# Patient Record
Sex: FEMALE | Race: BLACK OR AFRICAN AMERICAN | URBAN - METROPOLITAN AREA
[De-identification: names, ages, dates, MRNs, and addresses within clinical notes are randomized per-mention and may not be internally consistent; named-entity substitution may affect disease eponyms.]

---

## 2018-07-25 PROBLEM — 254637007 NON-SMALL CELL LUNG CANCER: Status: ACTIVE | Noted: 2018-07-25

## 2018-07-25 PROBLEM — 38341003 HYPERTENSION: Status: ACTIVE | Noted: 2018-07-25

## 2018-08-01 PROBLEM — 3723001 ARTHRITIS: Status: ACTIVE | Noted: 2018-08-01

## 2018-08-01 PROBLEM — 134407002 CHRONIC BACK PAIN: Status: ACTIVE | Noted: 2018-08-01

## 2018-08-01 PROBLEM — 235595009 GASTROESOPHAGEAL REFLUX DISEASE: Status: ACTIVE | Noted: 2018-08-01

## 2021-08-28 ENCOUNTER — TELEPHONE ENCOUNTER (OUTPATIENT)
Dept: URBAN - METROPOLITAN AREA CLINIC 13 | Facility: CLINIC | Age: 61
End: 2021-08-28

## 2021-08-28 RX ORDER — POTASSIUM CHLORIDE 1500 MG/1
TABLET, FILM COATED, EXTENDED RELEASE ORAL
OUTPATIENT
End: 2018-08-01

## 2021-08-29 ENCOUNTER — TELEPHONE ENCOUNTER (OUTPATIENT)
Dept: URBAN - METROPOLITAN AREA CLINIC 13 | Facility: CLINIC | Age: 61
End: 2021-08-29

## 2021-08-29 RX ORDER — MELOXICAM 15 MG/1
TABLET ORAL
Status: ACTIVE | COMMUNITY

## 2021-08-29 RX ORDER — LACTULOSE 10 G/15ML
SOLUTION ORAL
Status: ACTIVE | COMMUNITY

## 2021-08-29 RX ORDER — FERROUS SULFATE 324(65)MG
TABLET, DELAYED RELEASE (ENTERIC COATED) ORAL
Status: ACTIVE | COMMUNITY

## 2021-08-29 RX ORDER — HYDROCHLOROTHIAZIDE 12.5 MG/1
CAPSULE ORAL
Status: ACTIVE | COMMUNITY

## 2021-08-29 RX ORDER — LUBIPROSTONE 24 UG/1
CAPSULE, GELATIN COATED ORAL
Status: ACTIVE | COMMUNITY
Start: 2018-07-25

## 2021-08-29 RX ORDER — HYDROCODONE BITARTRATE AND ACETAMINOPHEN 7.5; 325 MG/1; MG/1
TABLET ORAL
Status: ACTIVE | COMMUNITY

## 2021-08-29 RX ORDER — GABAPENTIN 300 MG/1
CAPSULE ORAL
Status: ACTIVE | COMMUNITY

## 2021-08-29 RX ORDER — TIZANIDINE 2 MG/1
TABLET ORAL
Status: ACTIVE | COMMUNITY

## 2021-08-29 RX ORDER — DULOXETINE 60 MG/1
CAPSULE, DELAYED RELEASE PELLETS ORAL
Status: ACTIVE | COMMUNITY

## 2021-08-29 RX ORDER — OMEPRAZOLE 40 MG/1
CAPSULE, DELAYED RELEASE ORAL
Status: ACTIVE | COMMUNITY

## 2021-08-29 RX ORDER — METHOCARBAMOL 500 MG/1
TABLET, FILM COATED ORAL
Status: ACTIVE | COMMUNITY

## 2021-08-29 RX ORDER — PROCHLORPERAZINE MALEATE 10 MG/1
TABLET ORAL
Status: ACTIVE | COMMUNITY

## 2022-01-24 ENCOUNTER — OFFICE VISIT (OUTPATIENT)
Dept: URBAN - METROPOLITAN AREA CLINIC 48 | Facility: CLINIC | Age: 62
End: 2022-01-24

## 2022-01-24 RX ORDER — MELOXICAM 15 MG/1
TABLET ORAL
Status: ACTIVE | COMMUNITY

## 2022-01-24 RX ORDER — GABAPENTIN 300 MG/1
CAPSULE ORAL
Status: ACTIVE | COMMUNITY

## 2022-01-24 RX ORDER — FLUTICASONE PROPIONATE AND SALMETEROL 50; 250 UG/1; UG/1
1 PUFF POWDER RESPIRATORY (INHALATION) TWICE A DAY
Status: ACTIVE | COMMUNITY

## 2022-01-24 RX ORDER — OMEPRAZOLE 40 MG/1
CAPSULE, DELAYED RELEASE ORAL
Status: ACTIVE | COMMUNITY

## 2022-01-24 RX ORDER — HYDROCORTISONE 25 MG/G
1 APPLICATION CREAM TOPICAL ONCE A DAY
Status: ACTIVE | COMMUNITY

## 2022-01-24 RX ORDER — FERROUS SULFATE 324(65)MG
TABLET, DELAYED RELEASE (ENTERIC COATED) ORAL
Status: ACTIVE | COMMUNITY

## 2022-01-24 RX ORDER — HYDROCODONE BITARTRATE AND ACETAMINOPHEN 7.5; 325 MG/1; MG/1
TABLET ORAL
Status: ACTIVE | COMMUNITY

## 2022-01-24 RX ORDER — OXYBUTYNIN CHLORIDE 10 MG/1
1 TABLET TABLET, EXTENDED RELEASE ORAL ONCE A DAY
Status: ACTIVE | COMMUNITY

## 2022-01-24 RX ORDER — DULOXETINE 60 MG/1
CAPSULE, DELAYED RELEASE PELLETS ORAL
Status: ACTIVE | COMMUNITY

## 2022-01-24 RX ORDER — CYCLOBENZAPRINE HYDROCHLORIDE 10 MG/1
1 TABLET AT BEDTIME AS NEEDED TABLET, FILM COATED ORAL ONCE A DAY
Status: ACTIVE | COMMUNITY

## 2022-01-24 RX ORDER — ALBUTEROL SULFATE 90 UG/1
1 PUFF AS NEEDED AEROSOL, METERED RESPIRATORY (INHALATION)
Status: ACTIVE | COMMUNITY

## 2022-01-24 RX ORDER — HYDROCHLOROTHIAZIDE 12.5 MG/1
CAPSULE ORAL
Status: ACTIVE | COMMUNITY

## 2022-01-24 RX ORDER — PRAVASTATIN SODIUM 40 MG/1
1 TABLET TABLET ORAL ONCE A DAY
Status: ACTIVE | COMMUNITY

## 2022-02-08 ENCOUNTER — OFFICE VISIT (OUTPATIENT)
Dept: URBAN - METROPOLITAN AREA CLINIC 48 | Facility: CLINIC | Age: 62
End: 2022-02-08
Payer: MEDICARE

## 2022-02-08 VITALS
OXYGEN SATURATION: 96 % | SYSTOLIC BLOOD PRESSURE: 139 MMHG | TEMPERATURE: 97.9 F | WEIGHT: 241 LBS | BODY MASS INDEX: 40.15 KG/M2 | HEART RATE: 98 BPM | DIASTOLIC BLOOD PRESSURE: 83 MMHG | HEIGHT: 65 IN

## 2022-02-08 DIAGNOSIS — K59.09: ICD-10-CM

## 2022-02-08 DIAGNOSIS — Z86.010 PERSONAL HISTORY OF COLONIC POLYPS: ICD-10-CM

## 2022-02-08 DIAGNOSIS — K64.9 HEMORRHOIDS, UNSPECIFIED HEMORRHOID TYPE: ICD-10-CM

## 2022-02-08 PROCEDURE — 99214 OFFICE O/P EST MOD 30 MIN: CPT | Performed by: PHYSICIAN ASSISTANT

## 2022-02-08 RX ORDER — HYDROCODONE BITARTRATE AND ACETAMINOPHEN 7.5; 325 MG/1; MG/1
TABLET ORAL
Status: ACTIVE | COMMUNITY

## 2022-02-08 RX ORDER — PRAVASTATIN SODIUM 40 MG/1
1 TABLET TABLET ORAL ONCE A DAY
Status: ACTIVE | COMMUNITY

## 2022-02-08 RX ORDER — FERROUS SULFATE 324(65)MG
TABLET, DELAYED RELEASE (ENTERIC COATED) ORAL
Status: ACTIVE | COMMUNITY

## 2022-02-08 RX ORDER — FLUTICASONE PROPIONATE AND SALMETEROL 50; 250 UG/1; UG/1
1 PUFF POWDER RESPIRATORY (INHALATION) TWICE A DAY
Status: ON HOLD | COMMUNITY

## 2022-02-08 RX ORDER — PROCHLORPERAZINE MALEATE 10 MG/1
TABLET ORAL
Status: ON HOLD | COMMUNITY

## 2022-02-08 RX ORDER — ALBUTEROL SULFATE 90 UG/1
1 PUFF AS NEEDED AEROSOL, METERED RESPIRATORY (INHALATION)
Status: ACTIVE | COMMUNITY

## 2022-02-08 RX ORDER — METHOCARBAMOL 500 MG/1
TABLET, FILM COATED ORAL
Status: ON HOLD | COMMUNITY

## 2022-02-08 RX ORDER — GABAPENTIN 300 MG/1
CAPSULE ORAL
Status: ACTIVE | COMMUNITY

## 2022-02-08 RX ORDER — CYCLOBENZAPRINE HYDROCHLORIDE 10 MG/1
1 TABLET AT BEDTIME AS NEEDED TABLET, FILM COATED ORAL ONCE A DAY
Status: ACTIVE | COMMUNITY

## 2022-02-08 RX ORDER — MELOXICAM 15 MG/1
TABLET ORAL
Status: ACTIVE | COMMUNITY

## 2022-02-08 RX ORDER — TIZANIDINE 2 MG/1
TABLET ORAL
Status: ON HOLD | COMMUNITY

## 2022-02-08 RX ORDER — OXYBUTYNIN CHLORIDE 10 MG/1
1 TABLET TABLET, EXTENDED RELEASE ORAL ONCE A DAY
Status: ACTIVE | COMMUNITY

## 2022-02-08 RX ORDER — LUBIPROSTONE 24 UG/1
CAPSULE, GELATIN COATED ORAL
Status: ON HOLD | COMMUNITY
Start: 2018-07-25

## 2022-02-08 RX ORDER — MECLIZINE HYDROCHLORIDE 25 MG/1
1 TABLET AS NEEDED TABLET ORAL ONCE A DAY
Status: ACTIVE | COMMUNITY

## 2022-02-08 RX ORDER — DULOXETINE 60 MG/1
CAPSULE, DELAYED RELEASE PELLETS ORAL
Status: ACTIVE | COMMUNITY

## 2022-02-08 RX ORDER — HYDROCHLOROTHIAZIDE 12.5 MG/1
CAPSULE ORAL
Status: ACTIVE | COMMUNITY

## 2022-02-08 RX ORDER — LACTULOSE 10 G/15ML
SOLUTION ORAL
Status: ACTIVE | COMMUNITY

## 2022-02-08 RX ORDER — HYDROCORTISONE 25 MG/G
1 APPLICATION CREAM TOPICAL ONCE A DAY
Status: ACTIVE | COMMUNITY

## 2022-02-08 RX ORDER — OMEPRAZOLE 40 MG/1
CAPSULE, DELAYED RELEASE ORAL
Status: ACTIVE | COMMUNITY

## 2022-02-08 NOTE — HPI-TODAY'S VISIT:
Cinthia Bautista is a 60 y/o female presents for evaluation of constipation and hemorrhoids. She takes chronic narcotics for back pain as well as oral iron, and has had issues with chronic constipation. She states she has failed Miralax, Senna, Lactulose, Amitiza, Milk of Mg. She is usually having a bowel movement daily to every 3 days and stools are often [place] scale 2-3; her stools also appear flat at times. The chronic constipation gives her intermittent hemorrhoid pain/irritation and she tried topical hydrocortisone several months ago. She denies any recent rectal bleeding.   She has a chronic hx of iron deficiency anemia dating back to at least 2009 with Hgb of 10.9 and ferritin in 20's; EGD and colonoscopy 2009 with mild gastritis and a negative colonoscopy. Seen in 8/2018 for worsening anemia with Hgb of 8.7 in 5/18. Labs drawn prior to lobectomy for lung cancer that was dx in 11/17; received jw-adjuvant therapy with chemo and XRT first; since surgery no complications and margins clear (per pt). Does have chronic constipation from chronic narcotic use. A diagnostic EGD and colonoscopy 8/2018 showed a normal EGD, removal of 2 TA polyps, diverticulosis and grade I internal hemorrhoids. Labs from November 2021 and 1/13 showed normal CBC's.

## 2022-02-08 NOTE — PHYSICAL EXAM CONSTITUTIONAL:
well developed, well nourished , in no acute distress , ambulates with a cane , normal communication ability

## 2022-03-08 ENCOUNTER — OFFICE VISIT (OUTPATIENT)
Dept: URBAN - METROPOLITAN AREA CLINIC 48 | Facility: CLINIC | Age: 62
End: 2022-03-08

## 2022-03-10 ENCOUNTER — OFFICE VISIT (OUTPATIENT)
Dept: URBAN - METROPOLITAN AREA CLINIC 46 | Facility: CLINIC | Age: 62
End: 2022-03-10
Payer: MEDICARE

## 2022-03-10 VITALS
SYSTOLIC BLOOD PRESSURE: 147 MMHG | DIASTOLIC BLOOD PRESSURE: 83 MMHG | HEIGHT: 65 IN | HEART RATE: 93 BPM | BODY MASS INDEX: 39.45 KG/M2 | WEIGHT: 236.8 LBS | TEMPERATURE: 97.7 F

## 2022-03-10 DIAGNOSIS — K59.09: ICD-10-CM

## 2022-03-10 DIAGNOSIS — Z86.010 PERSONAL HISTORY OF COLONIC POLYPS: ICD-10-CM

## 2022-03-10 PROCEDURE — 99213 OFFICE O/P EST LOW 20 MIN: CPT | Performed by: PHYSICIAN ASSISTANT

## 2022-03-10 RX ORDER — LINACLOTIDE 145 UG/1
1 CAPSULE AT LEAST 30 MINUTES BEFORE THE FIRST MEAL OF THE DAY ON AN EMPTY STOMACH CAPSULE, GELATIN COATED ORAL ONCE A DAY
Qty: 30 | Refills: 3 | OUTPATIENT
Start: 2022-03-10 | End: 2022-07-08

## 2022-03-10 RX ORDER — HYDROCORTISONE 25 MG/G
1 APPLICATION CREAM TOPICAL ONCE A DAY
Status: ACTIVE | COMMUNITY

## 2022-03-10 RX ORDER — GABAPENTIN 300 MG/1
CAPSULE ORAL
Status: ACTIVE | COMMUNITY

## 2022-03-10 RX ORDER — FLUTICASONE PROPIONATE AND SALMETEROL 50; 250 UG/1; UG/1
1 PUFF POWDER RESPIRATORY (INHALATION) TWICE A DAY
Status: ON HOLD | COMMUNITY

## 2022-03-10 RX ORDER — PRAVASTATIN SODIUM 40 MG/1
1 TABLET TABLET ORAL ONCE A DAY
Status: ACTIVE | COMMUNITY

## 2022-03-10 RX ORDER — TIZANIDINE 2 MG/1
TABLET ORAL
Status: ON HOLD | COMMUNITY

## 2022-03-10 RX ORDER — PROCHLORPERAZINE MALEATE 10 MG/1
TABLET ORAL
Status: ON HOLD | COMMUNITY

## 2022-03-10 RX ORDER — ALBUTEROL SULFATE 90 UG/1
1 PUFF AS NEEDED AEROSOL, METERED RESPIRATORY (INHALATION)
Status: ACTIVE | COMMUNITY

## 2022-03-10 RX ORDER — HYDROCODONE BITARTRATE AND ACETAMINOPHEN 7.5; 325 MG/1; MG/1
TABLET ORAL
Status: ACTIVE | COMMUNITY

## 2022-03-10 RX ORDER — OMEPRAZOLE 40 MG/1
CAPSULE, DELAYED RELEASE ORAL
Status: ACTIVE | COMMUNITY

## 2022-03-10 RX ORDER — LACTULOSE 10 G/15ML
SOLUTION ORAL
Status: DISCONTINUED | COMMUNITY

## 2022-03-10 RX ORDER — METHOCARBAMOL 500 MG/1
TABLET, FILM COATED ORAL
Status: ON HOLD | COMMUNITY

## 2022-03-10 RX ORDER — LUBIPROSTONE 24 UG/1
CAPSULE, GELATIN COATED ORAL
Status: ON HOLD | COMMUNITY
Start: 2018-07-25

## 2022-03-10 RX ORDER — DULOXETINE 60 MG/1
CAPSULE, DELAYED RELEASE PELLETS ORAL
Status: ACTIVE | COMMUNITY

## 2022-03-10 RX ORDER — FERROUS SULFATE 324(65)MG
TABLET, DELAYED RELEASE (ENTERIC COATED) ORAL
Status: ACTIVE | COMMUNITY

## 2022-03-10 RX ORDER — CYCLOBENZAPRINE HYDROCHLORIDE 10 MG/1
1 TABLET AT BEDTIME AS NEEDED TABLET, FILM COATED ORAL ONCE A DAY
Status: ACTIVE | COMMUNITY

## 2022-03-10 RX ORDER — HYDROCHLOROTHIAZIDE 12.5 MG/1
CAPSULE ORAL
Status: ACTIVE | COMMUNITY

## 2022-03-10 RX ORDER — MECLIZINE HYDROCHLORIDE 25 MG/1
1 TABLET AS NEEDED TABLET ORAL ONCE A DAY
Status: ACTIVE | COMMUNITY

## 2022-03-10 RX ORDER — MELOXICAM 15 MG/1
TABLET ORAL
Status: ACTIVE | COMMUNITY

## 2022-03-10 RX ORDER — OXYBUTYNIN CHLORIDE 10 MG/1
1 TABLET TABLET, EXTENDED RELEASE ORAL ONCE A DAY
Status: ACTIVE | COMMUNITY

## 2022-03-10 NOTE — HPI-TODAY'S VISIT:
Cinthia Bautista is a 62 y/o female presents for 1 month follow up evaluation of constipation and hemorrhoids. She takes chronic narcotics for back pain as well as oral iron, and has had issues with chronic constipation. She has failed Miralax, Senna, Lactulose, Amitiza, Milk of Mg. She is usually having a bowel movement daily to every 3 days and stools are often [place] scale 2-3; her stools also appear flat at times. The chronic constipation gives her intermittent hemorrhoid pain/irritation and she tried topical hydrocortisone several months ago. She denies any recent rectal bleeding. At her last visit, she was given samples of Linzessa 145 mcg and Trulance, and advised to try Recticare Advanced PRN for hemorrhoids. She returns today and states the Linzess did help. However, since she has started a new diet and exercise program, she is having a good bowel movement daily to every other day, no abdominal pain, and no hemorrhoidal irritation.   She has a chronic hx of iron deficiency anemia dating back to at least 2009 with Hgb of 10.9 and ferritin in 20's; EGD and colonoscopy 2009 with mild gastritis and a negative colonoscopy. Seen in 8/2018 for worsening anemia with Hgb of 8.7 in 5/18. Labs drawn prior to lobectomy for lung cancer that was dx in 11/17; received jw-adjuvant therapy with chemo and XRT first; since surgery no complications and margins clear (per pt). Does have chronic constipation from chronic narcotic use. A diagnostic EGD and colonoscopy 8/2018 showed a normal EGD, removal of 2 TA polyps, diverticulosis and grade I internal hemorrhoids. Labs from November 2021 and 1/13/22 showed normal CBC's.

## 2023-01-19 ENCOUNTER — P2P PATIENT RECORD (OUTPATIENT)
Age: 63
End: 2023-01-19

## 2023-01-20 ENCOUNTER — DASHBOARD ENCOUNTERS (OUTPATIENT)
Age: 63
End: 2023-01-20

## 2023-01-20 ENCOUNTER — OFFICE VISIT (OUTPATIENT)
Dept: URBAN - METROPOLITAN AREA CLINIC 46 | Facility: CLINIC | Age: 63
End: 2023-01-20
Payer: MEDICARE

## 2023-01-20 VITALS
BODY MASS INDEX: 32.69 KG/M2 | HEIGHT: 65 IN | DIASTOLIC BLOOD PRESSURE: 77 MMHG | SYSTOLIC BLOOD PRESSURE: 137 MMHG | HEART RATE: 82 BPM | TEMPERATURE: 98.1 F | WEIGHT: 196.2 LBS

## 2023-01-20 DIAGNOSIS — K59.03 DRUG-INDUCED CONSTIPATION: ICD-10-CM

## 2023-01-20 DIAGNOSIS — R14.0 BLOATING: ICD-10-CM

## 2023-01-20 DIAGNOSIS — Z86.010 PERSONAL HISTORY OF COLONIC POLYPS: ICD-10-CM

## 2023-01-20 DIAGNOSIS — K64.2 GRADE III HEMORRHOIDS: ICD-10-CM

## 2023-01-20 DIAGNOSIS — D50.8 OTHER IRON DEFICIENCY ANEMIA: ICD-10-CM

## 2023-01-20 PROBLEM — 87522002: Status: ACTIVE | Noted: 2023-01-20

## 2023-01-20 PROBLEM — 21782001: Status: ACTIVE | Noted: 2023-01-20

## 2023-01-20 PROCEDURE — 99214 OFFICE O/P EST MOD 30 MIN: CPT | Performed by: INTERNAL MEDICINE

## 2023-01-20 RX ORDER — CYCLOBENZAPRINE HYDROCHLORIDE 10 MG/1
1 TABLET AT BEDTIME AS NEEDED TABLET, FILM COATED ORAL ONCE A DAY
Status: ACTIVE | COMMUNITY

## 2023-01-20 RX ORDER — LINACLOTIDE 145 UG/1
1 CAPSULE AT LEAST 30 MINUTES BEFORE THE FIRST MEAL OF THE DAY ON AN EMPTY STOMACH CAPSULE, GELATIN COATED ORAL ONCE A DAY
OUTPATIENT

## 2023-01-20 RX ORDER — LINACLOTIDE 145 UG/1
1 CAPSULE AT LEAST 30 MINUTES BEFORE THE FIRST MEAL OF THE DAY ON AN EMPTY STOMACH CAPSULE, GELATIN COATED ORAL ONCE A DAY
Status: ACTIVE | COMMUNITY

## 2023-01-20 RX ORDER — MELATONIN 1 MG/ML
1 CAPSULE LIQUID (ML) ORAL ONCE A DAY
Status: ACTIVE | COMMUNITY

## 2023-01-20 RX ORDER — MELOXICAM 15 MG/1
1 TABLET TABLET ORAL ONCE A DAY
Status: ACTIVE | COMMUNITY

## 2023-01-20 RX ORDER — HYDROCORTISONE 25 MG/G
1 APPLICATION AS NEEDED CREAM TOPICAL ONCE A DAY
Status: ACTIVE | COMMUNITY

## 2023-01-20 RX ORDER — DULOXETINE 60 MG/1
CAPSULE, DELAYED RELEASE PELLETS ORAL
Status: ACTIVE | COMMUNITY

## 2023-01-20 RX ORDER — OXYBUTYNIN CHLORIDE 10 MG/1
1 TABLET TABLET, EXTENDED RELEASE ORAL ONCE A DAY
Status: ACTIVE | COMMUNITY

## 2023-01-20 RX ORDER — NALOXEGOL OXALATE 25 MG/1
1 TABLET IN THE MORNING TABLET, FILM COATED ORAL ONCE A DAY
Qty: 30 | Refills: 3 | OUTPATIENT
Start: 2023-01-20 | End: 2023-05-20

## 2023-01-20 RX ORDER — MECLIZINE HYDROCHLORIDE 25 MG/1
1 TABLET AS NEEDED TABLET ORAL ONCE A DAY
Status: ACTIVE | COMMUNITY

## 2023-01-20 RX ORDER — GABAPENTIN 300 MG/1
1 CAPSULE CAPSULE ORAL ONCE A DAY
Status: ACTIVE | COMMUNITY

## 2023-01-20 RX ORDER — DICLOFENAC SODIUM TOPICAL GEL, 1% 10 MG/G
AS DIRECTED GEL TOPICAL
Status: ACTIVE | COMMUNITY

## 2023-01-20 RX ORDER — PRAVASTATIN SODIUM 40 MG/1
1 TABLET TABLET ORAL ONCE A DAY
Status: ACTIVE | COMMUNITY

## 2023-01-20 RX ORDER — CIPROFLOXACIN HYDROCHLORIDE 500 MG/1
1 TABLET TABLET, FILM COATED ORAL
Qty: 20 | Refills: 0 | OUTPATIENT
Start: 2023-01-20 | End: 2023-01-30

## 2023-01-20 RX ORDER — ALBUTEROL SULFATE 90 UG/1
1 PUFF AS NEEDED AEROSOL, METERED RESPIRATORY (INHALATION)
Status: ACTIVE | COMMUNITY

## 2023-01-20 RX ORDER — HYDROCODONE BITARTRATE AND ACETAMINOPHEN 7.5; 325 MG/1; MG/1
1 TABLET AS NEEDED TABLET ORAL EVERY 8 HOURS
Status: ACTIVE | COMMUNITY

## 2023-01-20 NOTE — HPI-TODAY'S VISIT:
Pt with chronic iron deficiency anemia since 2009 with Hgb in 10's and ferritin in 20's  narcotic induced constipation. She has failed Miralax, Senna, Lactulose, Amitiza, Milk of Mg. EGD and colonoscopy 2009 for anemia with mild gastritis and a negative colonoscopy. Seen in 8/2018 for worsening anemia with Hgb of 8.7 and  EGD and colonoscopy 8/2018 showed a normal EGD, removal of 2 TA polyps, diverticulosis and grade I internal hemorrhoids and source not found. Pt was s/p chemo and XRT for lung caner and underwent lobectomy.   Last seen 3/2022 and Hgb wnl and given Linzess for constipation. Now presents for a follow up. Having severe bloating for 3 months and worse when more constipated. Still with constipation and linzess not working as BM small and incomplete; can cause hemorrhoid flares. No pain or dyspepsia. Recent visit with Heme/Onc and Hgb in 12's and felt anemia was from chronic disease.

## 2023-01-20 NOTE — PHYSICAL EXAM HENT:
Head, normocephalic, atraumatic, Face, Face within normal limits, Ears, External ears within normal limits
- - -

## 2023-01-21 PROBLEM — 428283002: Status: ACTIVE | Noted: 2022-02-08

## 2023-01-30 ENCOUNTER — OFFICE VISIT (OUTPATIENT)
Dept: URBAN - METROPOLITAN AREA SURGERY CENTER 28 | Facility: SURGERY CENTER | Age: 63
End: 2023-01-30

## 2023-02-06 ENCOUNTER — OFFICE VISIT (OUTPATIENT)
Dept: URBAN - METROPOLITAN AREA SURGERY CENTER 28 | Facility: SURGERY CENTER | Age: 63
End: 2023-02-06
Payer: MEDICARE

## 2023-02-06 DIAGNOSIS — Z86.010 ADENOMAS PERSONAL HISTORY OF COLONIC POLYPS: ICD-10-CM

## 2023-02-06 PROCEDURE — G8907 PT DOC NO EVENTS ON DISCHARG: HCPCS | Performed by: INTERNAL MEDICINE

## 2023-02-06 PROCEDURE — G0105 COLORECTAL SCRN; HI RISK IND: HCPCS | Performed by: INTERNAL MEDICINE

## 2023-02-06 RX ORDER — DICLOFENAC SODIUM TOPICAL GEL, 1% 10 MG/G
AS DIRECTED GEL TOPICAL
Status: ACTIVE | COMMUNITY

## 2023-02-06 RX ORDER — HYDROCODONE BITARTRATE AND ACETAMINOPHEN 7.5; 325 MG/1; MG/1
1 TABLET AS NEEDED TABLET ORAL EVERY 8 HOURS
Status: ACTIVE | COMMUNITY

## 2023-02-06 RX ORDER — MELOXICAM 15 MG/1
1 TABLET TABLET ORAL ONCE A DAY
Status: ACTIVE | COMMUNITY

## 2023-02-06 RX ORDER — PRAVASTATIN SODIUM 40 MG/1
1 TABLET TABLET ORAL ONCE A DAY
Status: ACTIVE | COMMUNITY

## 2023-02-06 RX ORDER — MELATONIN 1 MG/ML
1 CAPSULE LIQUID (ML) ORAL ONCE A DAY
Status: ACTIVE | COMMUNITY

## 2023-02-06 RX ORDER — ALBUTEROL SULFATE 90 UG/1
1 PUFF AS NEEDED AEROSOL, METERED RESPIRATORY (INHALATION)
Status: ACTIVE | COMMUNITY

## 2023-02-06 RX ORDER — GABAPENTIN 300 MG/1
1 CAPSULE CAPSULE ORAL ONCE A DAY
Status: ACTIVE | COMMUNITY

## 2023-02-06 RX ORDER — OXYBUTYNIN CHLORIDE 10 MG/1
1 TABLET TABLET, EXTENDED RELEASE ORAL ONCE A DAY
Status: ACTIVE | COMMUNITY

## 2023-02-06 RX ORDER — DULOXETINE 60 MG/1
CAPSULE, DELAYED RELEASE PELLETS ORAL
Status: ACTIVE | COMMUNITY

## 2023-02-06 RX ORDER — HYDROCORTISONE 25 MG/G
1 APPLICATION AS NEEDED CREAM TOPICAL ONCE A DAY
Status: ACTIVE | COMMUNITY

## 2023-02-06 RX ORDER — NALOXEGOL OXALATE 25 MG/1
1 TABLET IN THE MORNING TABLET, FILM COATED ORAL ONCE A DAY
Qty: 30 | Refills: 3 | Status: ACTIVE | COMMUNITY
Start: 2023-01-20 | End: 2023-05-20

## 2023-02-06 RX ORDER — CYCLOBENZAPRINE HYDROCHLORIDE 10 MG/1
1 TABLET AT BEDTIME AS NEEDED TABLET, FILM COATED ORAL ONCE A DAY
Status: ACTIVE | COMMUNITY

## 2023-02-06 RX ORDER — MECLIZINE HYDROCHLORIDE 25 MG/1
1 TABLET AS NEEDED TABLET ORAL ONCE A DAY
Status: ACTIVE | COMMUNITY